# Patient Record
Sex: MALE
[De-identification: names, ages, dates, MRNs, and addresses within clinical notes are randomized per-mention and may not be internally consistent; named-entity substitution may affect disease eponyms.]

---

## 2017-03-20 ENCOUNTER — APPOINTMENT (OUTPATIENT)
Dept: INTERNAL MEDICINE | Facility: CLINIC | Age: 22
End: 2017-03-20

## 2017-03-20 ENCOUNTER — LABORATORY RESULT (OUTPATIENT)
Age: 22
End: 2017-03-20

## 2017-03-20 VITALS
WEIGHT: 212 LBS | SYSTOLIC BLOOD PRESSURE: 148 MMHG | HEIGHT: 70 IN | HEART RATE: 54 BPM | DIASTOLIC BLOOD PRESSURE: 82 MMHG | BODY MASS INDEX: 30.35 KG/M2

## 2017-03-20 DIAGNOSIS — R74.0 NONSPECIFIC ELEVATION OF LEVELS OF TRANSAMINASE AND LACTIC ACID DEHYDROGENASE [LDH]: ICD-10-CM

## 2017-03-21 LAB
ALBUMIN SERPL ELPH-MCNC: 4.6 G/DL
ALP BLD-CCNC: 82 U/L
ALT SERPL-CCNC: 44 U/L
AST SERPL-CCNC: 36 U/L
BILIRUB DIRECT SERPL-MCNC: 0.1 MG/DL
BILIRUB INDIRECT SERPL-MCNC: 0.2 MG/DL
BILIRUB SERPL-MCNC: 0.3 MG/DL
HBV CORE IGG+IGM SER QL: NONREACTIVE
PROT SERPL-MCNC: 7.4 G/DL

## 2017-06-30 ENCOUNTER — APPOINTMENT (OUTPATIENT)
Dept: INTERNAL MEDICINE | Facility: CLINIC | Age: 22
End: 2017-06-30

## 2018-01-12 ENCOUNTER — APPOINTMENT (OUTPATIENT)
Dept: INTERNAL MEDICINE | Facility: CLINIC | Age: 23
End: 2018-01-12
Payer: COMMERCIAL

## 2018-01-12 ENCOUNTER — MED ADMIN CHARGE (OUTPATIENT)
Age: 23
End: 2018-01-12

## 2018-01-12 PROCEDURE — 90746 HEPB VACCINE 3 DOSE ADULT IM: CPT

## 2018-01-12 PROCEDURE — 90471 IMMUNIZATION ADMIN: CPT

## 2018-07-02 ENCOUNTER — TRANSCRIPTION ENCOUNTER (OUTPATIENT)
Age: 23
End: 2018-07-02

## 2018-11-30 ENCOUNTER — APPOINTMENT (OUTPATIENT)
Dept: INTERNAL MEDICINE | Facility: CLINIC | Age: 23
End: 2018-11-30
Payer: COMMERCIAL

## 2018-11-30 VITALS
WEIGHT: 216 LBS | DIASTOLIC BLOOD PRESSURE: 60 MMHG | BODY MASS INDEX: 30.92 KG/M2 | SYSTOLIC BLOOD PRESSURE: 120 MMHG | HEART RATE: 55 BPM | OXYGEN SATURATION: 98 % | HEIGHT: 70 IN

## 2018-11-30 DIAGNOSIS — Z02.89 ENCOUNTER FOR OTHER ADMINISTRATIVE EXAMINATIONS: ICD-10-CM

## 2018-11-30 DIAGNOSIS — Z00.00 ENCOUNTER FOR GENERAL ADULT MEDICAL EXAMINATION W/OUT ABNORMAL FINDINGS: ICD-10-CM

## 2018-11-30 PROCEDURE — 99214 OFFICE O/P EST MOD 30 MIN: CPT

## 2018-12-02 PROBLEM — Z02.89 ENCOUNTER FOR COMPLETION OF FORM WITH PATIENT: Status: ACTIVE | Noted: 2018-12-02

## 2018-12-02 LAB
HBV SURFACE AB SER QL: REACTIVE
HBV SURFACE AG SER QL: NONREACTIVE
HCV AB SER QL: NONREACTIVE
HCV S/CO RATIO: 0.24 S/CO

## 2018-12-02 NOTE — PHYSICAL EXAM
[No Acute Distress] : no acute distress [Well-Appearing] : well-appearing [PERRL] : pupils equal round and reactive to light [EOMI] : extraocular movements intact [Normal Outer Ear/Nose] : the outer ears and nose were normal in appearance [Normal Oropharynx] : the oropharynx was normal [Supple] : supple [No Lymphadenopathy] : no lymphadenopathy [No Respiratory Distress] : no respiratory distress  [Clear to Auscultation] : lungs were clear to auscultation bilaterally [Normal Rate] : normal rate  [Regular Rhythm] : with a regular rhythm [Normal S1, S2] : normal S1 and S2 [Pedal Pulses Present] : the pedal pulses are present [No Edema] : there was no peripheral edema [Soft] : abdomen soft [Non Tender] : non-tender [Normal Bowel Sounds] : normal bowel sounds [Normal Anterior Cervical Nodes] : no anterior cervical lymphadenopathy [No CVA Tenderness] : no CVA  tenderness [No Spinal Tenderness] : no spinal tenderness [No Joint Swelling] : no joint swelling [Grossly Normal Strength/Tone] : grossly normal strength/tone [No Rash] : no rash [Normal Gait] : normal gait [Coordination Grossly Intact] : coordination grossly intact [Deep Tendon Reflexes (DTR)] : deep tendon reflexes were 2+ and symmetric [Normal Affect] : the affect was normal [Normal Insight/Judgement] : insight and judgment were intact

## 2018-12-03 LAB
M TB IFN-G BLD-IMP: NEGATIVE
QUANTIFERON TB PLUS MITOGEN MINUS NIL: 6.39 IU/ML
QUANTIFERON TB PLUS NIL: 0.13 IU/ML
QUANTIFERON TB PLUS TB1 MINUS NIL: 0.05 IU/ML
QUANTIFERON TB PLUS TB2 MINUS NIL: -0.01 IU/ML

## 2018-12-03 NOTE — HEALTH RISK ASSESSMENT
[No falls in past year] : Patient reported no falls in the past year [0] : 2) Feeling down, depressed, or hopeless: Not at all (0) [] : No [de-identified] : infrequent

## 2018-12-03 NOTE — ASSESSMENT
[FreeTextEntry1] : Form reviewed in detail. \par To be completed once labwork complete.\par Vision and Color vision tested. \par Color vision with Ishihara - no deficits\par Hubert: \par OD - 20/15\par OS - 20/13\par OU - 20/15\par \par urine tox to be done at alternative site.

## 2019-02-13 ENCOUNTER — APPOINTMENT (OUTPATIENT)
Dept: INFECTIOUS DISEASE | Facility: CLINIC | Age: 24
End: 2019-02-13
Payer: SELF-PAY

## 2019-02-13 DIAGNOSIS — Z71.89 OTHER SPECIFIED COUNSELING: ICD-10-CM

## 2019-02-13 PROCEDURE — 90691 TYPHOID VACCINE IM: CPT

## 2019-02-13 PROCEDURE — 90472 IMMUNIZATION ADMIN EACH ADD: CPT | Mod: NC

## 2019-02-13 PROCEDURE — 90471 IMMUNIZATION ADMIN: CPT | Mod: NC

## 2019-02-13 PROCEDURE — 99401 PREV MED CNSL INDIV APPRX 15: CPT | Mod: 25

## 2019-02-13 PROCEDURE — 90632 HEPA VACCINE ADULT IM: CPT

## 2019-02-13 RX ORDER — ATOVAQUONE AND PROGUANIL HYDROCHLORIDE 250; 100 MG/1; MG/1
250-100 TABLET, FILM COATED ORAL DAILY
Qty: 16 | Refills: 0 | Status: ACTIVE | COMMUNITY
Start: 2019-02-13 | End: 1900-01-01

## 2019-02-13 RX ORDER — AZITHROMYCIN 500 MG/1
500 TABLET, FILM COATED ORAL
Qty: 3 | Refills: 0 | Status: ACTIVE | COMMUNITY
Start: 2019-02-13 | End: 1900-01-01

## 2020-02-08 ENCOUNTER — TRANSCRIPTION ENCOUNTER (OUTPATIENT)
Age: 25
End: 2020-02-08

## 2025-04-25 ENCOUNTER — APPOINTMENT (OUTPATIENT)
Dept: HUMAN REPRODUCTION | Facility: CLINIC | Age: 30
End: 2025-04-25

## 2025-05-27 ENCOUNTER — NON-APPOINTMENT (OUTPATIENT)
Age: 30
End: 2025-05-27

## 2025-05-27 ENCOUNTER — APPOINTMENT (OUTPATIENT)
Dept: ORTHOPEDIC SURGERY | Facility: CLINIC | Age: 30
End: 2025-05-27
Payer: COMMERCIAL

## 2025-05-27 VITALS — BODY MASS INDEX: 27.09 KG/M2 | HEIGHT: 72 IN | WEIGHT: 200 LBS

## 2025-05-27 DIAGNOSIS — M22.2X1 PATELLOFEMORAL DISORDERS, RIGHT KNEE: ICD-10-CM

## 2025-05-27 DIAGNOSIS — M22.2X2 PATELLOFEMORAL DISORDERS, RIGHT KNEE: ICD-10-CM

## 2025-05-27 PROCEDURE — 99204 OFFICE O/P NEW MOD 45 MIN: CPT

## 2025-05-27 PROCEDURE — 73564 X-RAY EXAM KNEE 4 OR MORE: CPT | Mod: 50

## 2025-06-02 ENCOUNTER — RESULT REVIEW (OUTPATIENT)
Age: 30
End: 2025-06-02

## 2025-06-02 ENCOUNTER — APPOINTMENT (OUTPATIENT)
Dept: MRI IMAGING | Facility: CLINIC | Age: 30
End: 2025-06-02
Payer: COMMERCIAL

## 2025-06-02 ENCOUNTER — OUTPATIENT (OUTPATIENT)
Dept: OUTPATIENT SERVICES | Facility: HOSPITAL | Age: 30
LOS: 1 days | End: 2025-06-02
Payer: COMMERCIAL

## 2025-06-02 DIAGNOSIS — M22.2X1 PATELLOFEMORAL DISORDERS, RIGHT KNEE: ICD-10-CM

## 2025-06-02 PROCEDURE — 73721 MRI JNT OF LWR EXTRE W/O DYE: CPT

## 2025-06-02 PROCEDURE — 73721 MRI JNT OF LWR EXTRE W/O DYE: CPT | Mod: 26,RT

## 2025-06-10 ENCOUNTER — APPOINTMENT (OUTPATIENT)
Dept: ORTHOPEDIC SURGERY | Facility: CLINIC | Age: 30
End: 2025-06-10
Payer: COMMERCIAL

## 2025-06-10 PROCEDURE — 99214 OFFICE O/P EST MOD 30 MIN: CPT

## 2025-06-10 RX ORDER — MELOXICAM 15 MG/1
15 TABLET ORAL
Qty: 21 | Refills: 0 | Status: ACTIVE | COMMUNITY
Start: 2025-06-10 | End: 1900-01-01

## 2025-09-02 ENCOUNTER — APPOINTMENT (OUTPATIENT)
Dept: FAMILY MEDICINE | Facility: CLINIC | Age: 30
End: 2025-09-02
Payer: COMMERCIAL

## 2025-09-02 VITALS
DIASTOLIC BLOOD PRESSURE: 80 MMHG | BODY MASS INDEX: 28.17 KG/M2 | HEART RATE: 63 BPM | HEIGHT: 72 IN | TEMPERATURE: 97.7 F | SYSTOLIC BLOOD PRESSURE: 129 MMHG | WEIGHT: 208 LBS | OXYGEN SATURATION: 97 % | RESPIRATION RATE: 16 BRPM

## 2025-09-02 DIAGNOSIS — M25.561 PAIN IN RIGHT KNEE: ICD-10-CM

## 2025-09-02 DIAGNOSIS — Z80.43 FAMILY HISTORY OF MALIGNANT NEOPLASM OF TESTIS: ICD-10-CM

## 2025-09-02 DIAGNOSIS — Z82.0 FAMILY HISTORY OF EPILEPSY AND OTHER DISEASES OF THE NERVOUS SYSTEM: ICD-10-CM

## 2025-09-02 DIAGNOSIS — M25.562 PAIN IN RIGHT KNEE: ICD-10-CM

## 2025-09-02 DIAGNOSIS — Z00.00 ENCOUNTER FOR GENERAL ADULT MEDICAL EXAMINATION W/OUT ABNORMAL FINDINGS: ICD-10-CM

## 2025-09-02 DIAGNOSIS — Z83.3 FAMILY HISTORY OF DIABETES MELLITUS: ICD-10-CM

## 2025-09-02 DIAGNOSIS — Z83.438 FAMILY HISTORY OF OTHER DISORDER OF LIPOPROTEIN METABOLISM AND OTHER LIPIDEMIA: ICD-10-CM

## 2025-09-02 DIAGNOSIS — G89.29 PAIN IN RIGHT KNEE: ICD-10-CM

## 2025-09-02 DIAGNOSIS — M79.89 OTHER SPECIFIED SOFT TISSUE DISORDERS: ICD-10-CM

## 2025-09-02 PROCEDURE — 99385 PREV VISIT NEW AGE 18-39: CPT

## 2025-09-02 PROCEDURE — 36415 COLL VENOUS BLD VENIPUNCTURE: CPT

## 2025-09-05 LAB
ALBUMIN SERPL ELPH-MCNC: 4.5 G/DL
ALP BLD-CCNC: 51 U/L
ALT SERPL-CCNC: 81 U/L
ANION GAP SERPL CALC-SCNC: 12 MMOL/L
AST SERPL-CCNC: 41 U/L
BASOPHILS # BLD AUTO: 0.02 K/UL
BASOPHILS NFR BLD AUTO: 0.5 %
BILIRUB SERPL-MCNC: 0.3 MG/DL
BUN SERPL-MCNC: 19 MG/DL
CALCIUM SERPL-MCNC: 9.9 MG/DL
CHLORIDE SERPL-SCNC: 101 MMOL/L
CHOLEST SERPL-MCNC: 227 MG/DL
CO2 SERPL-SCNC: 26 MMOL/L
CREAT SERPL-MCNC: 1.18 MG/DL
CRP SERPL-MCNC: <3 MG/L
EGFRCR SERPLBLD CKD-EPI 2021: 85 ML/MIN/1.73M2
EOSINOPHIL # BLD AUTO: 0.1 K/UL
EOSINOPHIL NFR BLD AUTO: 2.3 %
ERYTHROCYTE [SEDIMENTATION RATE] IN BLOOD BY WESTERGREN METHOD: 9 MM/HR
ESTIMATED AVERAGE GLUCOSE: 108 MG/DL
GLUCOSE SERPL-MCNC: 84 MG/DL
HBA1C MFR BLD HPLC: 5.4 %
HCT VFR BLD CALC: 46.5 %
HDLC SERPL-MCNC: 45 MG/DL
HGB BLD-MCNC: 15.2 G/DL
IMM GRANULOCYTES NFR BLD AUTO: 0 %
LDLC SERPL-MCNC: 165 MG/DL
LYMPHOCYTES # BLD AUTO: 1.64 K/UL
LYMPHOCYTES NFR BLD AUTO: 37.6 %
MAN DIFF?: NORMAL
MCHC RBC-ENTMCNC: 28.7 PG
MCHC RBC-ENTMCNC: 32.7 G/DL
MCV RBC AUTO: 87.7 FL
MONOCYTES # BLD AUTO: 0.44 K/UL
MONOCYTES NFR BLD AUTO: 10.1 %
NEUTROPHILS # BLD AUTO: 2.16 K/UL
NEUTROPHILS NFR BLD AUTO: 49.5 %
NONHDLC SERPL-MCNC: 183 MG/DL
PLATELET # BLD AUTO: 176 K/UL
POTASSIUM SERPL-SCNC: 4.1 MMOL/L
PROT SERPL-MCNC: 6.9 G/DL
RBC # BLD: 5.3 M/UL
RBC # FLD: 13.6 %
RHEUMATOID FACT SERPL-ACNC: <10 IU/ML
SODIUM SERPL-SCNC: 140 MMOL/L
TESTOST SERPL-MCNC: 307 NG/DL
TRIGL SERPL-MCNC: 96 MG/DL
TSH SERPL-ACNC: 2.72 UIU/ML
WBC # FLD AUTO: 4.36 K/UL